# Patient Record
Sex: MALE | URBAN - METROPOLITAN AREA
[De-identification: names, ages, dates, MRNs, and addresses within clinical notes are randomized per-mention and may not be internally consistent; named-entity substitution may affect disease eponyms.]

---

## 2018-06-07 ENCOUNTER — APPOINTMENT (RX ONLY)
Dept: URBAN - METROPOLITAN AREA CLINIC 91 | Facility: CLINIC | Age: 68
Setting detail: DERMATOLOGY
End: 2018-06-07

## 2018-06-07 DIAGNOSIS — L738 OTHER SPECIFIED DISEASES OF HAIR AND HAIR FOLLICLES: ICD-10-CM

## 2018-06-07 DIAGNOSIS — L21.8 OTHER SEBORRHEIC DERMATITIS: ICD-10-CM

## 2018-06-07 DIAGNOSIS — L663 OTHER SPECIFIED DISEASES OF HAIR AND HAIR FOLLICLES: ICD-10-CM

## 2018-06-07 DIAGNOSIS — L73.9 FOLLICULAR DISORDER, UNSPECIFIED: ICD-10-CM

## 2018-06-07 PROBLEM — L02.425 FURUNCLE OF RIGHT LOWER LIMB: Status: ACTIVE | Noted: 2018-06-07

## 2018-06-07 PROBLEM — L02.426 FURUNCLE OF LEFT LOWER LIMB: Status: ACTIVE | Noted: 2018-06-07

## 2018-06-07 PROBLEM — L02.223 FURUNCLE OF CHEST WALL: Status: ACTIVE | Noted: 2018-06-07

## 2018-06-07 PROBLEM — N40.0 BENIGN PROSTATIC HYPERPLASIA WITHOUT LOWER URINARY TRACT SYMPTOMS: Status: ACTIVE | Noted: 2018-06-07

## 2018-06-07 PROBLEM — F41.9 ANXIETY DISORDER, UNSPECIFIED: Status: ACTIVE | Noted: 2018-06-07

## 2018-06-07 PROCEDURE — ? COUNSELING

## 2018-06-07 PROCEDURE — 99213 OFFICE O/P EST LOW 20 MIN: CPT

## 2018-06-07 PROCEDURE — ? PRESCRIPTION

## 2018-06-07 RX ORDER — KETOCONAZOLE 20.5 MG/ML
SHAMPOO, SUSPENSION TOPICAL
Qty: 1 | Refills: 10 | Status: ERX | COMMUNITY
Start: 2018-06-07

## 2018-06-07 RX ORDER — AMOXICILLIN 500 MG/1
CAPSULE ORAL
Qty: 60 | Refills: 1 | Status: ERX | COMMUNITY
Start: 2018-06-07

## 2018-06-07 RX ADMIN — AMOXICILLIN: 500 CAPSULE ORAL at 15:38

## 2018-06-07 RX ADMIN — KETOCONAZOLE: 20.5 SHAMPOO, SUSPENSION TOPICAL at 15:44

## 2018-06-07 ASSESSMENT — LOCATION ZONE DERM
LOCATION ZONE: LEG
LOCATION ZONE: FACE
LOCATION ZONE: TRUNK

## 2018-06-07 ASSESSMENT — SEVERITY ASSESSMENT
HOW SEVERE IS THIS PATIENT'S CONDITION?: MILD
SEVERITY: MILD

## 2018-06-07 ASSESSMENT — LOCATION SIMPLE DESCRIPTION DERM
LOCATION SIMPLE: CHIN
LOCATION SIMPLE: RIGHT THIGH
LOCATION SIMPLE: LEFT THIGH
LOCATION SIMPLE: CHEST

## 2018-06-07 ASSESSMENT — LOCATION DETAILED DESCRIPTION DERM
LOCATION DETAILED: LEFT MEDIAL INFERIOR CHEST
LOCATION DETAILED: RIGHT ANTERIOR PROXIMAL THIGH
LOCATION DETAILED: LEFT ANTERIOR PROXIMAL THIGH
LOCATION DETAILED: LEFT CHIN

## 2018-06-07 ASSESSMENT — PAIN INTENSITY VAS: HOW INTENSE IS YOUR PAIN 0 BEING NO PAIN, 10 BEING THE MOST SEVERE PAIN POSSIBLE?: NO PAIN

## 2018-08-07 ENCOUNTER — HOSPITAL ENCOUNTER (OUTPATIENT)
Dept: HOSPITAL 57 - BURRAD | Age: 68
Discharge: HOME | End: 2018-08-07
Attending: FAMILY MEDICINE
Payer: MEDICARE

## 2018-08-07 DIAGNOSIS — S92.354S: Primary | ICD-10-CM

## 2018-08-07 NOTE — RAD
RIGHT FOOT THREE VIEWS:

8/7/18

 

No prior films were available for comparison. Patient is said to have a known fifth metatarsal fractu
re. 

 

There indeed is a fracture at the base of the fifth metatarsal. There is slight distraction of the fr
acture fragment. Not knowing the age of the injury, I cannot comment on whether healing is delayed or
 appropriate. My guess from looking at the bone would be that there may be delayed healing, as the am
ount of callus present does not seem to be excessive. No fracture or periosteal reaction was seen els
ewhere in the foot. There is a large calcaneal spur. 

 

IMPRESSION:  

Fracture at the base of the fifth metatarsal, age indeterminate, but substantially unhealed. 

 

POS: HOME

## 2018-08-09 ENCOUNTER — APPOINTMENT (RX ONLY)
Dept: URBAN - METROPOLITAN AREA CLINIC 91 | Facility: CLINIC | Age: 68
Setting detail: DERMATOLOGY
End: 2018-08-09

## 2018-08-09 DIAGNOSIS — D18.0 HEMANGIOMA: ICD-10-CM

## 2018-08-09 PROBLEM — D18.01 HEMANGIOMA OF SKIN AND SUBCUTANEOUS TISSUE: Status: ACTIVE | Noted: 2018-08-09

## 2018-08-09 PROCEDURE — 99212 OFFICE O/P EST SF 10 MIN: CPT

## 2018-08-09 PROCEDURE — ? COUNSELING

## 2018-08-09 ASSESSMENT — LOCATION DETAILED DESCRIPTION DERM: LOCATION DETAILED: LEFT CENTRAL POSTAURICULAR SKIN

## 2018-08-09 ASSESSMENT — LOCATION SIMPLE DESCRIPTION DERM: LOCATION SIMPLE: SCALP

## 2018-08-09 ASSESSMENT — LOCATION ZONE DERM: LOCATION ZONE: SCALP

## 2018-08-28 ENCOUNTER — RX ONLY (OUTPATIENT)
Age: 68
Setting detail: RX ONLY
End: 2018-08-28

## 2018-08-28 RX ORDER — AMOXICILLIN 500 MG/1
CAPSULE ORAL
Qty: 60 | Refills: 1 | Status: ERX

## 2018-11-14 ENCOUNTER — HOSPITAL ENCOUNTER (OUTPATIENT)
Dept: HOSPITAL 92 - RAD | Age: 68
Discharge: HOME | End: 2018-11-14
Attending: INTERNAL MEDICINE
Payer: MEDICARE

## 2018-11-14 DIAGNOSIS — R91.8: ICD-10-CM

## 2018-11-14 DIAGNOSIS — R06.00: Primary | ICD-10-CM

## 2018-11-14 DIAGNOSIS — J44.9: ICD-10-CM

## 2018-11-14 PROCEDURE — 71046 X-RAY EXAM CHEST 2 VIEWS: CPT

## 2018-11-14 NOTE — RAD
TWO VIEW CHEST:

 

Indication: Dyspnea. 

 

FINDINGS: 

Lungs are hyperinflated. There is mild prominence of the cardiac silhouette. No vascular congestion. 
Linear densities at the left lateral lung zone may relate to scar or volume loss. There is mild osseo
us degenerative change. 

 

IMPRESSION: 

1. Hyperinflated lungs indicating COPD. Correlate clinically. 

2. Mild left basilar atelectasis versus scar. 

3. Mild prominence of the cardiac silhouette. 

 

POS: [facility]

## 2018-11-16 ENCOUNTER — HOSPITAL ENCOUNTER (OUTPATIENT)
Dept: HOSPITAL 92 - SLEEPLAB | Age: 68
Discharge: HOME | End: 2018-11-16
Attending: INTERNAL MEDICINE
Payer: MEDICARE

## 2018-11-16 DIAGNOSIS — I10: ICD-10-CM

## 2018-11-16 DIAGNOSIS — R06.83: ICD-10-CM

## 2018-11-16 DIAGNOSIS — G47.33: Primary | ICD-10-CM

## 2018-11-16 DIAGNOSIS — G47.00: ICD-10-CM

## 2018-11-16 DIAGNOSIS — G47.10: ICD-10-CM

## 2018-11-16 PROCEDURE — 95811 POLYSOM 6/>YRS CPAP 4/> PARM: CPT

## 2019-04-26 ENCOUNTER — HOSPITAL ENCOUNTER (EMERGENCY)
Dept: HOSPITAL 57 - BURERS | Age: 69
Discharge: ANOTHER HOSPITAL | End: 2019-04-26
Payer: MEDICARE

## 2019-04-26 ENCOUNTER — HOSPITAL ENCOUNTER (INPATIENT)
Dept: HOSPITAL 92 - ERS | Age: 69
LOS: 2 days | Discharge: HOME | DRG: 418 | End: 2019-04-28
Attending: SURGERY | Admitting: SURGERY
Payer: MEDICARE

## 2019-04-26 VITALS — BODY MASS INDEX: 49.3 KG/M2

## 2019-04-26 DIAGNOSIS — K81.0: Primary | ICD-10-CM

## 2019-04-26 DIAGNOSIS — Z98.890: ICD-10-CM

## 2019-04-26 DIAGNOSIS — J44.9: ICD-10-CM

## 2019-04-26 DIAGNOSIS — Z88.5: ICD-10-CM

## 2019-04-26 DIAGNOSIS — K76.0: ICD-10-CM

## 2019-04-26 DIAGNOSIS — Z90.89: ICD-10-CM

## 2019-04-26 DIAGNOSIS — Z79.899: ICD-10-CM

## 2019-04-26 DIAGNOSIS — K80.44: Primary | ICD-10-CM

## 2019-04-26 DIAGNOSIS — Z79.82: ICD-10-CM

## 2019-04-26 DIAGNOSIS — K42.9: ICD-10-CM

## 2019-04-26 DIAGNOSIS — F41.9: ICD-10-CM

## 2019-04-26 DIAGNOSIS — E66.01: ICD-10-CM

## 2019-04-26 DIAGNOSIS — I10: ICD-10-CM

## 2019-04-26 DIAGNOSIS — Z88.8: ICD-10-CM

## 2019-04-26 DIAGNOSIS — Z99.89: ICD-10-CM

## 2019-04-26 DIAGNOSIS — Z79.01: ICD-10-CM

## 2019-04-26 DIAGNOSIS — G47.30: ICD-10-CM

## 2019-04-26 LAB
ALBUMIN SERPL BCG-MCNC: 4.1 G/DL (ref 3.4–4.8)
ALP SERPL-CCNC: 67 U/L (ref 40–150)
ALT SERPL W P-5'-P-CCNC: 64 U/L (ref 8–55)
ANION GAP SERPL CALC-SCNC: 14 MMOL/L (ref 10–20)
AST SERPL-CCNC: 101 U/L (ref 5–34)
BASOPHILS # BLD AUTO: 0.1 THOU/UL (ref 0–0.2)
BASOPHILS NFR BLD AUTO: 0.9 % (ref 0–1)
BILIRUB SERPL-MCNC: 1.5 MG/DL (ref 0.2–1.2)
BUN SERPL-MCNC: 17 MG/DL (ref 8.4–25.7)
CALCIUM SERPL-MCNC: 9.7 MG/DL (ref 7.8–10.44)
CHLORIDE SERPL-SCNC: 99 MMOL/L (ref 98–107)
CO2 SERPL-SCNC: 30 MMOL/L (ref 23–31)
CREAT CL PREDICTED SERPL C-G-VRATE: 0 ML/MIN (ref 70–130)
EOSINOPHIL # BLD AUTO: 0.1 THOU/UL (ref 0–0.7)
EOSINOPHIL NFR BLD AUTO: 0.8 % (ref 0–10)
GLOBULIN SER CALC-MCNC: 2.8 G/DL (ref 2.4–3.5)
GLUCOSE SERPL-MCNC: 156 MG/DL (ref 80–115)
HGB BLD-MCNC: 14.1 G/DL (ref 14–18)
LIPASE SERPL-CCNC: 57 U/L (ref 8–78)
LYMPHOCYTES # BLD AUTO: 2.4 THOU/UL (ref 1.2–3.4)
LYMPHOCYTES NFR BLD AUTO: 21.1 % (ref 21–51)
MCH RBC QN AUTO: 28.7 PG (ref 27–31)
MCV RBC AUTO: 91.1 FL (ref 78–98)
MONOCYTES # BLD AUTO: 0.6 THOU/UL (ref 0.11–0.59)
MONOCYTES NFR BLD AUTO: 5.2 % (ref 0–10)
NEUTROPHILS # BLD AUTO: 8.1 THOU/UL (ref 1.4–6.5)
NEUTROPHILS NFR BLD AUTO: 72 % (ref 42–75)
PLATELET # BLD AUTO: 271 THOU/UL (ref 130–400)
POTASSIUM SERPL-SCNC: 4.3 MMOL/L (ref 3.5–5.1)
RBC # BLD AUTO: 4.91 MILL/UL (ref 4.7–6.1)
SODIUM SERPL-SCNC: 139 MMOL/L (ref 136–145)
WBC # BLD AUTO: 11.2 THOU/UL (ref 4.8–10.8)

## 2019-04-26 PROCEDURE — 96365 THER/PROPH/DIAG IV INF INIT: CPT

## 2019-04-26 PROCEDURE — 0FT44ZZ RESECTION OF GALLBLADDER, PERCUTANEOUS ENDOSCOPIC APPROACH: ICD-10-PCS | Performed by: SURGERY

## 2019-04-26 PROCEDURE — 80053 COMPREHEN METABOLIC PANEL: CPT

## 2019-04-26 PROCEDURE — 83605 ASSAY OF LACTIC ACID: CPT

## 2019-04-26 PROCEDURE — 83690 ASSAY OF LIPASE: CPT

## 2019-04-26 PROCEDURE — 94660 CPAP INITIATION&MGMT: CPT

## 2019-04-26 PROCEDURE — G0009 ADMIN PNEUMOCOCCAL VACCINE: HCPCS

## 2019-04-26 PROCEDURE — BF10YZZ FLUOROSCOPY OF BILE DUCTS USING OTHER CONTRAST: ICD-10-PCS | Performed by: SURGERY

## 2019-04-26 PROCEDURE — 36415 COLL VENOUS BLD VENIPUNCTURE: CPT

## 2019-04-26 PROCEDURE — 84484 ASSAY OF TROPONIN QUANT: CPT

## 2019-04-26 PROCEDURE — 47532 INJECTION FOR CHOLANGIOGRAM: CPT

## 2019-04-26 PROCEDURE — 96361 HYDRATE IV INFUSION ADD-ON: CPT

## 2019-04-26 PROCEDURE — 85025 COMPLETE CBC W/AUTO DIFF WBC: CPT

## 2019-04-26 PROCEDURE — 90471 IMMUNIZATION ADMIN: CPT

## 2019-04-26 PROCEDURE — 88304 TISSUE EXAM BY PATHOLOGIST: CPT

## 2019-04-26 PROCEDURE — 74177 CT ABD & PELVIS W/CONTRAST: CPT

## 2019-04-26 PROCEDURE — 96375 TX/PRO/DX INJ NEW DRUG ADDON: CPT

## 2019-04-26 PROCEDURE — 0WQF0ZZ REPAIR ABDOMINAL WALL, OPEN APPROACH: ICD-10-PCS | Performed by: SURGERY

## 2019-04-26 PROCEDURE — 90670 PCV13 VACCINE IM: CPT

## 2019-04-26 NOTE — CT
CT ABDOMEN AND PELVIS WITH IV CONTRAST:

4/26/19

 

HISTORY: 

Right sided abdominal pain.

 

FINDINGS: 

Comparison made with  the CTA of 5/29/18.

 

The lung bases are unremarkable.  There is fatty infiltration of the liver without focal mass or abno
rmal biliary ductal dilatation. An 18 mm calcified calculus in the gallbladder is again seen. The spl
een, pancreas, right adrenal gland are normal. There is a tiny nonobstructing calculus in the right k
idney. There is a stable 3.5 cm cyst in the inferior pole of the left kidney.  A stable 18 mm left ad
renal nodule is seen. 

 

No free air, free fluid, or lymphadenopathy seen in the abdomen  or pelvis. The small bowel loops are
 not abnormally dilated. There is a small fat containing umbilical hernia. There is a normal appearin
g appendix. Mild prostatic enlargement is seen. There are vascular calcifications without evidence of
 aneurysmal dilatation of the abdominal aorta. There are degenerative changes in the spine.

 

Small fat containing inguinal hernia is present. There is mild colonic diverticulosis without diverti
culitis. 

 

IMPRESSION: 

1.      Fatty liver.

2.      Cholelithiasis. 

3.      Tiny nonobstructing right renal calculus.

4.      Left renal cyst. 

5.      No evidence of appendicitis. 

6.      Mild colonic diverticulosis. 

 

POS: [facility]

## 2019-04-27 LAB
ALBUMIN SERPL BCG-MCNC: 3.6 G/DL (ref 3.4–4.8)
ALP SERPL-CCNC: 70 U/L (ref 40–150)
ALT SERPL W P-5'-P-CCNC: 208 U/L (ref 8–55)
ANION GAP SERPL CALC-SCNC: 11 MMOL/L (ref 10–20)
AST SERPL-CCNC: 287 U/L (ref 5–34)
BILIRUB SERPL-MCNC: 2.6 MG/DL (ref 0.2–1.2)
BUN SERPL-MCNC: 19 MG/DL (ref 8.4–25.7)
CALCIUM SERPL-MCNC: 9.3 MG/DL (ref 7.8–10.44)
CHLORIDE SERPL-SCNC: 104 MMOL/L (ref 98–107)
CO2 SERPL-SCNC: 28 MMOL/L (ref 23–31)
CREAT CL PREDICTED SERPL C-G-VRATE: 107 ML/MIN (ref 70–130)
GLOBULIN SER CALC-MCNC: 2.8 G/DL (ref 2.4–3.5)
GLUCOSE SERPL-MCNC: 134 MG/DL (ref 80–115)
POTASSIUM SERPL-SCNC: 3.9 MMOL/L (ref 3.5–5.1)
SODIUM SERPL-SCNC: 139 MMOL/L (ref 136–145)

## 2019-04-27 NOTE — HP
HISTORY OF PRESENT ILLNESS:  Mr. Jo is a 68-year-old male patient followed by Dr. Ino Bains.  The patient is morbidly obese, has sleep apnea, uses CPAP at home.

He weighs 138 kg.  He presents to the hospital because of upper abdominal

discomfort.  He was found to have cholecystitis and cholelithiasis.  He is admitted

to the hospital, planning for laparoscopic cholecystectomy tomorrow. 



ALLERGIES:  CODEINE, HYDROCODONE, ANTIHISTAMINES.



TOBACCO:  None.



ALCOHOL:  Socially.



MEDICATIONS:  

1. Hydrochlorothiazide 12.5 mg a day.

2. Losartan 50 mg a day.

3. Aspirin 81 mg a day.

4. Xanax 0.5 mg a day.



PAST MEDICAL HISTORY:  Hypertension, sleep apnea, obesity.  The patient has had

screening colonoscopy 4-5 years ago and states that he is due for another one again,

although there were no significant findings.  The patient has had a cardiac

catheterization a month ago in [place], that was normal.  Echocardiogram was normal

__________ he has had a tonsillectomy, right eye surgery, and umbilical hernia

repair, he thinks with mesh.  He had a recurrence a year and a half later of his

umbilical hernia when he was lifting a heavy object.  He asked if this can be

repaired during his cholecystectomy.  PAD, the patient had a balloon angioplasty of

his right superficial femoral artery in the past. 



REVIEW OF SYSTEMS:  Ten-point noncontributory except as noted above.



PHYSICAL EXAMINATION:

VITAL SIGNS:  Weight 138 kg, blood pressure 138/77, heart rate 76, respiratory rate

18, temperature 98 degrees. 

HEAD, EYES, EARS, NOSE and THROAT:  Unremarkable. 

LUNGS:  Clear to auscultation. 

CARDIAC:  Regular rate and rhythm.  No murmur or gallop. 

ABDOMEN:  Tender in his right upper quadrant with guarding and rebound.  Positive

Singh's.  Umbilical hernia present.  Rotund, obese abdomen. 

EXTREMITIES:  Unremarkable.  Palpable pedal pulses.



DIAGNOSTIC DATA:  CAT scan of the abdomen and pelvis reveals that he is obese, fatty

liver, cholelithiasis, nonobstructing right renal calculus with renal cyst, colonic

diverticulosis. 



LABORATORY DATA:  White count 11, hemoglobin 14, platelet count 271,000.  Sodium

139, potassium 4.3, carbon dioxide 30, BUN 17, and creatinine 1.23, bilirubin 1.5,

AST and  and 64 respectively, alkaline phosphatase 67, lipase normal. 



ASSESSMENT/PLAN:  

1. Cholecystitis and cholelithiasis.  We recommend laparoscopic video

cholecystectomy.  Risks of infection, bleeding, and reoperation discussed, he

consents. 

2. Umbilical hernia.  He desires repair during the same anesthetic.  I have informed

him that I cannot use mesh in this repair because of his obesity and recurrence.  He

has had increased risk for another recurrence, but he wished to proceed with

umbilical hernia repair without mesh.  We will plan this during the laparoscopic

cholecystectomy. 

3. Sleep apnea.

4. Morbid obesity.

5. Hypertension.







Job ID:  [id number]

## 2019-04-28 VITALS — SYSTOLIC BLOOD PRESSURE: 136 MMHG | DIASTOLIC BLOOD PRESSURE: 78 MMHG | TEMPERATURE: 99.3 F

## 2019-04-28 NOTE — OP
DATE OF PROCEDURE:  04/27/2019



POSTOPERATIVE DIAGNOSES:  Chronic cholecystitis and cholelithiasis, fatty liver,

morbid obesity, sleep apnea, and recurrent umbilical hernia. 



POSTOPERATIVE DIAGNOSES:  Chronic cholecystitis and cholelithiasis, fatty liver,

morbid obesity, sleep apnea, and recurrent umbilical hernia. 



PROCEDURE PERFORMED:  Laparoscopic video cholecystectomy, normal intraoperative

cholangiogram (bilirubin 1.5, mildly elevated transaminases, probably secondary to

fatty liver), fluoroscopy use, repair of recurrent umbilical hernia without mesh. 



ANESTHESIA:  General, local 0.5% Marcaine with epinephrine 30 mL.



DESCRIPTION OF PROCEDURE:  The patient was taken to the operating room, where under

general anesthesia, abdomen was prepared with ChloraPrep and draped in routine

fashion.  Local anesthetic was infiltrated in the skin and subcutaneous tissue about

each port site.  Infraumbilical incision was made and pneumoperitoneum to 15 mmHg

obtained with a Veress needle, replaced with a 5 port, video laparoscope inserted.

Right subxiphoid incision was made and 11 port placed, right subcostal incision was

made, midclavicular and anterior axillary lines, a 5 port was placed.  Liver was

fatty, but not cirrhotic.  Gallbladder wall was thickened and fatty.  Fundus was

grasped at the cephalad.  Visualization was difficult, but with extreme positioning,

infundibulum grasped and reflected laterally.  Cystic artery and duct were carefully

dissected free.  Cystic duct clamped, clipped on the gallbladder side.  An opening

made in the cystic duct.  Cholangiocatheter inserted.  Cholangiograms obtained with

fluoroscopy.  After administering glucagon intravenously and cholangiograms revealed

free flow of contrast into the duodenum without filling defects in the common

hepatic, common bile, left and right hepatic ducts.  Cholangiocatheter removed.

Cystic duct double clipped proximally and divided, gallbladder dissected free.

Cystic artery identified, doubly clipped, divided.  Gallbladder dissected free from

liver bed obtaining good hemostasis in the liver bed prior to division of the final

peritoneal attachments.  Gallbladder and contents and large stone removed, submitted

to Pathology.  Good hemostasis ensured with the cautery.  Irrigant and

pneumoperitoneum evacuated.  All ports removed, all upper subcostal incisions were

approximated with continuous subcutaneous suture of 4-0 Monocryl.  Infraumbilical

incision lengthened and flaps created superiorly and inferiorly.  An umbilical

hernia defect was larger than expected, it was about 4 cm.  The fascial edges

identified, dissected free and pants-over-vest type approximation performed with

interrupted sutures of 0 PDS pop-offs.  Subcutaneous tissue was approximated with

3-0 Monocryl.  Umbilicus tacked to the fascia, closed with 3-0 Monocryl.  Skin

approximated with continuous subcutaneous suture of 4-0 Monocryl and Derma glue

applied.  The patient tolerated the procedure well. 







Job ID:  [id number]

## 2019-04-28 NOTE — RAD
OPERATIVE CHOLANGIOGRAM:

 

Date:  04/27/19 

 

Two fluoroscopic images are presented from OR. 

 

INDICATION:

Intraoperative imaging during cholecystectomy procedure to evaluate bile ducts. 

 

FINDINGS/IMPRESSION: 

Common duct is opacified. No definite filling defect is identified. The ampulla and spill into the du
odenum is not definitely confirmed on this study. 

 

 

POS: OFF

## 2019-04-28 NOTE — DIS
DATE OF ADMISSION:  04/26/2019



DATE OF DISCHARGE:  04/27/2019



DISCHARGE DIAGNOSES:  

1. Recurrent umbilical hernia.

2. Cholecystitis.

3. Cholelithiasis.

4. Morbid obesity.

5. Sleep apnea.

6. Fatty liver.

7. History of cardiac catheterization, normal in [place] a few weeks ago.



HOME MEDICATION:  

1. Losartan 50 mg a day.

2. Hydrochlorothiazide 12.5 mg a day.

3. Aspirin 81 mg a day.

4. Alprazolam 0.5 mg p.r.n.



DISCHARGE MEDICATIONS:  Resume home medications.

1. Tylenol p.r.n. pain.

2. Motrin p.r.n. pain.

3. Ultram p.r.n. pain.



ALLERGIES:  CODEINE AND HYDROCODONE.



HISTORY:  A 68-year-old male patient presented to the emergency room.  CAT scan

revealed cholelithiasis.  Previous CAT scan had substantiated this in August.

Patient had had cardiac catheterization in [place] recently.  An echocardiogram was

done at [facility].  The patient's wears a CPAP at home.  He is

morbidly obese.  He reports having had an umbilical hernia repair in the past with

mesh but had recurrence of __________ when he is lifting something heavy.  He has

unrelenting right upper quadrant pain and epigastric pain.  His bilirubin is

slightly elevated.  Bile duct not dilated on CAT scan.  Patient was hospitalized

overnight, received IV antibiotics and 

underwent laparoscopic video cholecystectomy and cholangiograms that were normal.

Recurrent umbilical hernia repair undertaken with absorbable sutures without mesh.

Postop, he did well and discharged home with the above regimen.  Follow medically in

2 to 3 weeks.  No lifting over 25 pounds for 8 weeks.  Diet and activity as

tolerated. 







Job ID:  [id number]

## 2019-07-15 ENCOUNTER — APPOINTMENT (RX ONLY)
Dept: URBAN - METROPOLITAN AREA CLINIC 91 | Facility: CLINIC | Age: 69
Setting detail: DERMATOLOGY
End: 2019-07-15

## 2019-07-15 DIAGNOSIS — L0391 CELLULITIS AND ABSCESS OF UNSPECIFIED SITES: ICD-10-CM

## 2019-07-15 DIAGNOSIS — L0390 CELLULITIS AND ABSCESS OF UNSPECIFIED SITES: ICD-10-CM

## 2019-07-15 DIAGNOSIS — L72.8 OTHER FOLLICULAR CYSTS OF THE SKIN AND SUBCUTANEOUS TISSUE: ICD-10-CM

## 2019-07-15 PROBLEM — L03.312 CELLULITIS OF BACK [ANY PART EXCEPT BUTTOCK]: Status: ACTIVE | Noted: 2019-07-15

## 2019-07-15 PROCEDURE — 10060 I&D ABSCESS SIMPLE/SINGLE: CPT

## 2019-07-15 PROCEDURE — ? COUNSELING

## 2019-07-15 PROCEDURE — ? INCISION AND DRAINAGE

## 2019-07-15 PROCEDURE — 99213 OFFICE O/P EST LOW 20 MIN: CPT | Mod: 25

## 2019-07-15 ASSESSMENT — LOCATION DETAILED DESCRIPTION DERM: LOCATION DETAILED: RIGHT SUPERIOR MEDIAL MIDBACK

## 2019-07-15 ASSESSMENT — SEVERITY ASSESSMENT: SEVERITY: MILD

## 2019-07-15 ASSESSMENT — PAIN INTENSITY VAS: HOW INTENSE IS YOUR PAIN 0 BEING NO PAIN, 10 BEING THE MOST SEVERE PAIN POSSIBLE?: NO PAIN

## 2019-07-15 ASSESSMENT — LOCATION ZONE DERM: LOCATION ZONE: TRUNK

## 2019-07-15 ASSESSMENT — LOCATION SIMPLE DESCRIPTION DERM: LOCATION SIMPLE: RIGHT LOWER BACK

## 2019-07-15 NOTE — PROCEDURE: INCISION AND DRAINAGE
Curette Text (Optional): After the contents were expressed a curette was used to partially remove the cyst wall.
Size Of Lesion In Cm (Optional But May Be Required For Some Insurances): 0
Render Postcare In Note?: No
Wound Care: Petrolatum
Dressing: dry sterile dressing
Suture Text: The incision was partially closed with
Epidermal Sutures: 4-0 Ethilon
Lesion Type: Abscess
Consent was obtained and risks were reviewed including but not limited to delayed wound healing, infection, need for multiple I and D's, and pain.
Epidermal Closure: simple interrupted
Method: 3 mm punch
Detail Level: Simple
Post-Care Instructions: I reviewed with the patient in detail post-care instructions. Patient should keep wound covered and call the office should any redness, pain, swelling or worsening occur.
Anesthesia Type: 1% lidocaine with epinephrine
Preparation Text: The area was prepped in the usual clean fashion.

## 2019-07-29 ENCOUNTER — RX ONLY (OUTPATIENT)
Age: 69
Setting detail: RX ONLY
End: 2019-07-29

## 2019-07-29 RX ORDER — AMOXICILLIN 500 MG/1
CAPSULE ORAL
Qty: 60 | Refills: 3 | Status: ERX

## 2019-08-19 ENCOUNTER — HOSPITAL ENCOUNTER (OUTPATIENT)
Dept: HOSPITAL 92 - RAD | Age: 69
Discharge: HOME | End: 2019-08-19
Attending: INTERNAL MEDICINE
Payer: MEDICARE

## 2019-08-19 DIAGNOSIS — R06.00: Primary | ICD-10-CM

## 2019-08-19 PROCEDURE — 71046 X-RAY EXAM CHEST 2 VIEWS: CPT

## 2019-08-19 NOTE — RAD
RADIOGRAPH CHEST 2 VIEWS:



DATE:

8/19/2019



HISTORY:

68-year-old male with dyspnea



FINDINGS:

There is no airspace density, pulmonary edema, pleural effusion, pneumothorax, or cardiomegaly.



IMPRESSION:

No acute cardiopulmonary findings.



Reported By: Damon Elizalde 

Electronically Signed:  8/19/2019 11:18 AM

## 2020-01-16 ENCOUNTER — HOSPITAL ENCOUNTER (OUTPATIENT)
Dept: HOSPITAL 57 - BURRAD | Age: 70
Discharge: HOME | End: 2020-01-16
Attending: FAMILY MEDICINE
Payer: MEDICARE

## 2020-01-16 DIAGNOSIS — S76.212D: Primary | ICD-10-CM

## 2020-01-16 PROCEDURE — 72170 X-RAY EXAM OF PELVIS: CPT

## 2020-01-16 NOTE — RAD
LEFT HIP TWO VIEWS:

1/16/20

 

No fracture was seen. The joint space is normal in width and the articular surfaces are smooth. I am 
not impressed by any excessive arthritic change.

 

IMPRESSION: 

No acute finding. 

 

POS: HOME

## 2020-01-16 NOTE — RAD
PELVIS ONE VIEW:

1/16/20

 

The bony pelvis appears intact. No fracture or area of bony destruction was seen. The SI joints are s
ymmetrical and the symphysis shows no widening or offset. The hip joints are symmetrical. The pubic r
ings are symmetrical. 

 

IMPRESSION: 

No acute findings.

 

POS: HOME

## 2020-01-21 ENCOUNTER — HOSPITAL ENCOUNTER (OUTPATIENT)
Dept: HOSPITAL 92 - RAD | Age: 70
Discharge: HOME | End: 2020-01-21
Attending: INTERNAL MEDICINE
Payer: MEDICARE

## 2020-01-21 DIAGNOSIS — R06.00: Primary | ICD-10-CM

## 2020-01-21 PROCEDURE — 71046 X-RAY EXAM CHEST 2 VIEWS: CPT

## 2020-01-21 NOTE — RAD
CHEST 2 VIEWS:

 

Date:  01/21/2020

 

COMPARISON:  

08/19/19. 

 

HISTORY:  

Shortness of breath. 

 

FINDINGS:

Mild increased linear interstitial density with pulmonary hyperinflation, stable. Stable heart and me
diastinal contours. No pneumothorax, pleural fluid, focal consolidation, or alveolar edema. 

 

IMPRESSION: 

No acute findings.  

 

 

POS: H

## 2021-01-28 ENCOUNTER — HOSPITAL ENCOUNTER (OUTPATIENT)
Dept: HOSPITAL 92 - BICRAD | Age: 71
Discharge: HOME | End: 2021-01-28
Attending: INTERNAL MEDICINE
Payer: MEDICARE

## 2021-01-28 DIAGNOSIS — R06.00: Primary | ICD-10-CM

## 2021-01-28 PROCEDURE — 71046 X-RAY EXAM CHEST 2 VIEWS: CPT

## 2021-01-28 NOTE — RAD
EXAM:

Chest PA and lateral:



HISTORY:

Dyspnea 



COMPARISON:

8/24/2020



FINDINGS:



Heart: Normal cardiac silhouette

Aorta: Atherosclerosis

Pulmonary vessels: Normal

Costophrenic angles: Costophrenic angles are clear. 

Lungs: No consolidation or masses. Chronic lung parenchymal changes

Pneumothorax: No pneumothorax

Osseous structures: No osseous abnormalities

IMPRESSION:

No acute cardiopulmonary process. 







Reported By: Dave Rodriguez 

Electronically Signed:  1/28/2021 2:38 PM

## 2022-12-14 ENCOUNTER — APPOINTMENT (RX ONLY)
Dept: URBAN - METROPOLITAN AREA CLINIC 117 | Facility: CLINIC | Age: 72
Setting detail: DERMATOLOGY
End: 2022-12-14

## 2022-12-14 DIAGNOSIS — L663 OTHER SPECIFIED DISEASES OF HAIR AND HAIR FOLLICLES: ICD-10-CM

## 2022-12-14 DIAGNOSIS — L71.8 OTHER ROSACEA: ICD-10-CM

## 2022-12-14 DIAGNOSIS — L73.9 FOLLICULAR DISORDER, UNSPECIFIED: ICD-10-CM

## 2022-12-14 DIAGNOSIS — L738 OTHER SPECIFIED DISEASES OF HAIR AND HAIR FOLLICLES: ICD-10-CM

## 2022-12-14 PROBLEM — L02.222 FURUNCLE OF BACK [ANY PART, EXCEPT BUTTOCK]: Status: ACTIVE | Noted: 2022-12-14

## 2022-12-14 PROCEDURE — ? PRESCRIPTION MEDICATION MANAGEMENT

## 2022-12-14 PROCEDURE — ? PRESCRIPTION

## 2022-12-14 PROCEDURE — 99203 OFFICE O/P NEW LOW 30 MIN: CPT

## 2022-12-14 PROCEDURE — ? COUNSELING

## 2022-12-14 RX ORDER — MUPIROCIN 20 MG/G
OINTMENT TOPICAL
Qty: 22 | Refills: 4 | Status: ERX | COMMUNITY
Start: 2022-12-14

## 2022-12-14 RX ORDER — AMPICILLIN 500 MG/1
CAPSULE ORAL
Qty: 30 | Refills: 3 | Status: ERX | COMMUNITY
Start: 2022-12-14

## 2022-12-14 RX ADMIN — MUPIROCIN: 20 OINTMENT TOPICAL at 00:00

## 2022-12-14 RX ADMIN — AMPICILLIN: 500 CAPSULE ORAL at 00:00

## 2022-12-14 ASSESSMENT — LOCATION ZONE DERM
LOCATION ZONE: FACE
LOCATION ZONE: TRUNK

## 2022-12-14 ASSESSMENT — LOCATION SIMPLE DESCRIPTION DERM
LOCATION SIMPLE: RIGHT LOWER BACK
LOCATION SIMPLE: LEFT CHEEK

## 2022-12-14 ASSESSMENT — LOCATION DETAILED DESCRIPTION DERM
LOCATION DETAILED: LEFT INFERIOR CENTRAL MALAR CHEEK
LOCATION DETAILED: RIGHT INFERIOR LATERAL MIDBACK

## 2022-12-14 NOTE — HPI: RASH
What Type Of Note Output Would You Prefer (Optional)?: Bullet Format
How Severe Is Your Rash?: mild
Is This A New Presentation, Or A Follow-Up?: Rash
Additional History: Patient states that the lesions come and go.

## 2022-12-14 NOTE — PROCEDURE: PRESCRIPTION MEDICATION MANAGEMENT
Initiate Treatment: Ampicillin
Detail Level: Zone
Plan: Discussed with patient that in the future we would like to switch him to a topical
Render In Strict Bullet Format?: No

## 2023-03-02 ENCOUNTER — HOSPITAL ENCOUNTER (OUTPATIENT)
Dept: HOSPITAL 92 - RAD | Age: 73
Discharge: HOME | End: 2023-03-02
Attending: INTERNAL MEDICINE
Payer: MEDICARE

## 2023-03-02 DIAGNOSIS — R06.00: Primary | ICD-10-CM

## 2023-03-02 PROCEDURE — 71046 X-RAY EXAM CHEST 2 VIEWS: CPT

## 2024-01-03 ENCOUNTER — HOSPITAL ENCOUNTER (OUTPATIENT)
Dept: HOSPITAL 92 - BICCT | Age: 74
Discharge: HOME | End: 2024-01-03
Attending: FAMILY MEDICINE
Payer: MEDICARE

## 2024-01-03 DIAGNOSIS — N28.89: ICD-10-CM

## 2024-01-03 DIAGNOSIS — R10.84: Primary | ICD-10-CM

## 2024-01-03 DIAGNOSIS — R19.5: ICD-10-CM

## 2024-01-03 DIAGNOSIS — N20.0: ICD-10-CM

## 2024-01-03 DIAGNOSIS — K59.00: ICD-10-CM

## 2024-01-03 PROCEDURE — 74177 CT ABD & PELVIS W/CONTRAST: CPT

## 2025-01-06 ENCOUNTER — HOSPITAL ENCOUNTER (OUTPATIENT)
Dept: HOSPITAL 92 - RAD | Age: 75
Discharge: HOME | End: 2025-01-06
Attending: INTERNAL MEDICINE
Payer: COMMERCIAL

## 2025-01-06 DIAGNOSIS — R06.00: Primary | ICD-10-CM

## 2025-01-06 PROCEDURE — 71046 X-RAY EXAM CHEST 2 VIEWS: CPT
